# Patient Record
Sex: MALE | Race: WHITE | Employment: FULL TIME | URBAN - METROPOLITAN AREA
[De-identification: names, ages, dates, MRNs, and addresses within clinical notes are randomized per-mention and may not be internally consistent; named-entity substitution may affect disease eponyms.]

---

## 2017-02-24 RX ORDER — ESCITALOPRAM OXALATE 20 MG/1
TABLET ORAL
Qty: 30 TABLET | Refills: 0 | Status: SHIPPED | OUTPATIENT
Start: 2017-02-24 | End: 2017-03-25 | Stop reason: SDUPTHER

## 2017-07-03 RX ORDER — ESCITALOPRAM OXALATE 20 MG/1
TABLET ORAL
Qty: 30 TABLET | Refills: 0 | Status: SHIPPED | OUTPATIENT
Start: 2017-07-03 | End: 2017-08-02 | Stop reason: SDUPTHER

## 2017-08-02 RX ORDER — ESCITALOPRAM OXALATE 20 MG/1
TABLET ORAL
Qty: 30 TABLET | Refills: 0 | Status: SHIPPED | OUTPATIENT
Start: 2017-08-02 | End: 2017-08-31 | Stop reason: SDUPTHER

## 2018-07-19 ENCOUNTER — OFFICE VISIT (OUTPATIENT)
Dept: FAMILY MEDICINE CLINIC | Age: 53
End: 2018-07-19

## 2018-07-19 VITALS
WEIGHT: 241.8 LBS | BODY MASS INDEX: 33.85 KG/M2 | SYSTOLIC BLOOD PRESSURE: 110 MMHG | HEIGHT: 71 IN | DIASTOLIC BLOOD PRESSURE: 70 MMHG

## 2018-07-19 DIAGNOSIS — Z01.818 PRE-OP EXAM: Primary | ICD-10-CM

## 2018-07-19 DIAGNOSIS — Z79.01 LONG TERM CURRENT USE OF ANTICOAGULANT THERAPY: ICD-10-CM

## 2018-07-19 DIAGNOSIS — D68.51 FACTOR V LEIDEN MUTATION (HCC): ICD-10-CM

## 2018-07-19 DIAGNOSIS — M65.311 TRIGGER FINGER OF RIGHT THUMB: ICD-10-CM

## 2018-07-19 PROCEDURE — 99214 OFFICE O/P EST MOD 30 MIN: CPT | Performed by: FAMILY MEDICINE

## 2018-07-19 PROCEDURE — 93000 ELECTROCARDIOGRAM COMPLETE: CPT | Performed by: FAMILY MEDICINE

## 2018-07-19 ASSESSMENT — PATIENT HEALTH QUESTIONNAIRE - PHQ9
1. LITTLE INTEREST OR PLEASURE IN DOING THINGS: 0
2. FEELING DOWN, DEPRESSED OR HOPELESS: 0
SUM OF ALL RESPONSES TO PHQ9 QUESTIONS 1 & 2: 0
SUM OF ALL RESPONSES TO PHQ QUESTIONS 1-9: 0

## 2018-07-19 ASSESSMENT — ENCOUNTER SYMPTOMS
BLOOD IN STOOL: 0
CONSTIPATION: 0
SHORTNESS OF BREATH: 0
WHEEZING: 0
SORE THROAT: 0
DIARRHEA: 0
TROUBLE SWALLOWING: 0
CHEST TIGHTNESS: 0
ABDOMINAL PAIN: 0

## 2018-07-19 NOTE — PROGRESS NOTES
Never Used    Alcohol use 0.0 oz/week      Comment: social     Family History   Problem Relation Age of Onset    Hypertension Mother     Hypertension Father     Cancer Father          Review of Systems   Constitutional: Negative for fatigue and unexpected weight change. HENT: Negative for congestion, postnasal drip, sore throat and trouble swallowing. Eyes: Negative for visual disturbance. Respiratory: Negative for chest tightness, shortness of breath and wheezing. Cardiovascular: Negative for chest pain and palpitations. Gastrointestinal: Negative for abdominal pain, blood in stool, constipation and diarrhea. Genitourinary: Negative for difficulty urinating, frequency and urgency. Musculoskeletal: Negative for arthralgias and joint swelling. Neurological: Negative for dizziness, weakness and numbness. Hematological: Negative for adenopathy. Does not bruise/bleed easily. Psychiatric/Behavioral: Negative for sleep disturbance. The patient is not nervous/anxious. A complete 14 point  review of systems was completed; pertinent positives are noted in HPI    Vitals:    07/19/18 1408   BP: 110/70   Weight: 241 lb 12.8 oz (109.7 kg)   Height: 5' 11\" (1.803 m)     Body mass index is 33.72 kg/m². Wt Readings from Last 3 Encounters:   07/19/18 241 lb 12.8 oz (109.7 kg)   08/16/17 230 lb (104.3 kg)   12/02/16 237 lb (107.5 kg)     BP Readings from Last 3 Encounters:   07/19/18 110/70   08/17/17 (!) 141/93   12/02/16 108/78        /70   Ht 5' 11\" (1.803 m)   Wt 241 lb 12.8 oz (109.7 kg)   BMI 33.72 kg/m²     Objective:   Physical Exam   Constitutional: He is oriented to person, place, and time. He appears well-developed. No distress. HENT:   Right Ear: External ear normal.   Left Ear: External ear normal.   Nose: Nose normal.   Mouth/Throat: Oropharynx is clear and moist.   Eyes: Conjunctivae are normal. No scleral icterus. Neck: Neck supple. No thyromegaly present. Cardiovascular: Normal rate, regular rhythm, normal heart sounds and intact distal pulses. No murmur heard. Pulmonary/Chest: Effort normal and breath sounds normal. No respiratory distress. Abdominal: Soft. Bowel sounds are normal. There is no tenderness. obese   Musculoskeletal: He exhibits no edema. Lymphadenopathy:     He has no cervical adenopathy. Neurological: He is alert and oriented to person, place, and time. Skin: Skin is warm. No rash noted. Psychiatric: He has a normal mood and affect.  Thought content normal.       Assessment:      Assessment/Plan:  Ernie Wong was seen today for pre-op exam.    Diagnoses and all orders for this visit:    Pre-op exam  -     EKG 12 Lead    Trigger finger of right thumb    Long term current use of anticoagulant therapy    Factor V Leiden mutation [facility])            Plan:      [id number] Cristin Chua for surg   forms completed and faxed

## 2018-11-07 DIAGNOSIS — F32.1 MODERATE MAJOR DEPRESSIVE DISORDER WITH ANXIETY SINGLE EPISODE (HCC): Primary | ICD-10-CM

## 2018-11-07 DIAGNOSIS — F41.8 MODERATE MAJOR DEPRESSIVE DISORDER WITH ANXIETY SINGLE EPISODE (HCC): Primary | ICD-10-CM

## 2018-11-07 RX ORDER — LORAZEPAM 0.5 MG/1
0.5 TABLET ORAL 2 TIMES DAILY PRN
Qty: 60 TABLET | Refills: 0 | Status: SHIPPED | OUTPATIENT
Start: 2018-11-07 | End: 2018-12-07

## 2018-11-07 RX ORDER — ESCITALOPRAM OXALATE 20 MG/1
40 TABLET ORAL DAILY
Qty: 60 TABLET | Refills: 3 | Status: SHIPPED | OUTPATIENT
Start: 2018-11-07 | End: 2019-03-17 | Stop reason: SDUPTHER

## 2019-06-12 ENCOUNTER — TELEPHONE (OUTPATIENT)
Dept: FAMILY MEDICINE CLINIC | Age: 54
End: 2019-06-12

## 2019-06-12 NOTE — TELEPHONE ENCOUNTER
Patient needs a preop for left TKR, 6/28/19, Dr. Laura Mistry with [facility]. Please advise patient as to where he may be fit in with Dr. Deep Nolasco.

## 2019-06-25 ENCOUNTER — OFFICE VISIT (OUTPATIENT)
Dept: FAMILY MEDICINE CLINIC | Age: 54
End: 2019-06-25
Payer: COMMERCIAL

## 2019-06-25 VITALS
HEIGHT: 71 IN | WEIGHT: 248.2 LBS | DIASTOLIC BLOOD PRESSURE: 80 MMHG | SYSTOLIC BLOOD PRESSURE: 126 MMHG | BODY MASS INDEX: 34.75 KG/M2

## 2019-06-25 DIAGNOSIS — D68.51 FACTOR V LEIDEN MUTATION (HCC): ICD-10-CM

## 2019-06-25 DIAGNOSIS — F41.8 MODERATE MAJOR DEPRESSIVE DISORDER WITH ANXIETY SINGLE EPISODE (HCC): ICD-10-CM

## 2019-06-25 DIAGNOSIS — Z01.818 PREOP GENERAL PHYSICAL EXAM: Primary | ICD-10-CM

## 2019-06-25 DIAGNOSIS — M17.12 PRIMARY OSTEOARTHRITIS OF LEFT KNEE: ICD-10-CM

## 2019-06-25 DIAGNOSIS — F32.1 MODERATE MAJOR DEPRESSIVE DISORDER WITH ANXIETY SINGLE EPISODE (HCC): ICD-10-CM

## 2019-06-25 PROCEDURE — 93000 ELECTROCARDIOGRAM COMPLETE: CPT | Performed by: FAMILY MEDICINE

## 2019-06-25 PROCEDURE — 99214 OFFICE O/P EST MOD 30 MIN: CPT | Performed by: FAMILY MEDICINE

## 2019-06-25 ASSESSMENT — ENCOUNTER SYMPTOMS
CONSTIPATION: 0
ABDOMINAL PAIN: 0
CHEST TIGHTNESS: 0
TROUBLE SWALLOWING: 0
BLOOD IN STOOL: 0
DIARRHEA: 0
SHORTNESS OF BREATH: 0
SORE THROAT: 0
WHEEZING: 0

## 2019-06-25 ASSESSMENT — PATIENT HEALTH QUESTIONNAIRE - PHQ9
SUM OF ALL RESPONSES TO PHQ QUESTIONS 1-9: 0
2. FEELING DOWN, DEPRESSED OR HOPELESS: 0
SUM OF ALL RESPONSES TO PHQ9 QUESTIONS 1 & 2: 0
SUM OF ALL RESPONSES TO PHQ QUESTIONS 1-9: 0
1. LITTLE INTEREST OR PLEASURE IN DOING THINGS: 0

## 2019-06-25 NOTE — PROGRESS NOTES
Subjective:      Patient ID: Jeovany Glasgow is a 47 y.o. male. HPI  preop physical   sched for left knee arthroscopy   dr Brandee Ann orthopedic    Persistent knee pain pain with ambulation   poor response to all conservative nmeasures   xray document extensive DJD    No Known Allergies    Current Outpatient Medications   Medication Sig      rivaroxaban (XARELTO) 20 MG TABS tablet       escitalopram (LEXAPRO) 20 MG tablet TAKE 2 TABLETS BY MOUTH DAILY      Multiple Vitamins-Minerals (THERAPEUTIC MULTIVITAMIN-MINERALS) tablet Take 1 tablet by mouth daily      Nutritional Supplements (VITAMIN D MAINTENANCE PO) Take 3,000 Units by mouth daily      Calcium-Vitamin D-Vitamin K (CALCIUM SOFT CHEWS PO) Take 1 tablet by mouth daily       No current facility-administered medications for this visit. Past Medical History:   Diagnosis Date    Asthma     releated to allergies    Factor V Leiden ([address]     Hypertension     resolved after gastric sleeve done    PE (pulmonary embolism) 2007    hx of couple times     Past Surgical History:   Procedure Laterality Date    COLONOSCOPY  11/12    normal, [id number]  dr Shalonda Trejo      right    KNEE ARTHROSCOPY      x 3 right knee    SHOULDER SURGERY      right     STOMACH SURGERY  08/2016    Gastric Sleeve - lost about 100 lbs so far    TESTICLE SURGERY      tumor removed right testicle- benign     WRIST SURGERY      right ulnar surgery      Social History     Tobacco Use    Smoking status: Former Smoker     Packs/day: 0.25     Years: 0.50     Pack years: 0.12    Smokeless tobacco: Never Used   Substance Use Topics    Alcohol use: Yes     Alcohol/week: 0.0 oz     Comment: social    Drug use: No     Family History   Problem Relation Age of Onset    Hypertension Mother     Hypertension Father     Cancer Father          Review of Systems   Constitutional: Negative for fatigue and unexpected weight change.    HENT: Negative for congestion, postnasal drip, sore throat and trouble swallowing. Eyes: Negative for visual disturbance. Respiratory: Negative for chest tightness, shortness of breath and wheezing. Cardiovascular: Negative for chest pain and palpitations. Gastrointestinal: Negative for abdominal pain, blood in stool, constipation and diarrhea. Genitourinary: Negative for difficulty urinating, frequency and urgency. Musculoskeletal: Positive for arthralgias and gait problem. Negative for joint swelling. Neurological: Negative for dizziness, weakness and numbness. Hematological: Negative for adenopathy. Does not bruise/bleed easily. Psychiatric/Behavioral: Negative for sleep disturbance. The patient is not nervous/anxious. A complete 14 point  review of systems was completed; pertinent positives are noted in HPI    Vitals:    06/25/19 1254   BP: 126/80   Weight: 248 lb 3.2 oz (112.6 kg)   Height: 5' 11\" (1.803 m)     Body mass index is 34.62 kg/m². Wt Readings from Last 3 Encounters:   06/25/19 248 lb 3.2 oz (112.6 kg)   07/19/18 241 lb 12.8 oz (109.7 kg)   08/16/17 230 lb (104.3 kg)     BP Readings from Last 3 Encounters:   06/25/19 126/80   07/19/18 110/70   08/17/17 (!) 141/93        /80   Ht 5' 11\" (1.803 m)   Wt 248 lb 3.2 oz (112.6 kg)   BMI 34.62 kg/m²    Objective:   Physical Exam   Constitutional: He is oriented to person, place, and time. He appears well-developed. No distress. HENT:   Right Ear: External ear normal.   Left Ear: External ear normal.   Nose: Nose normal.   Mouth/Throat: Oropharynx is clear and moist.   Eyes: Conjunctivae are normal. No scleral icterus. Neck: Neck supple. No thyromegaly present. Cardiovascular: Normal rate, regular rhythm, normal heart sounds and intact distal pulses. No murmur heard. Pulmonary/Chest: Effort normal and breath sounds normal. No respiratory distress. Abdominal: Soft. Bowel sounds are normal. There is no tenderness. Musculoskeletal: He exhibits no edema. Lymphadenopathy:     He has no cervical adenopathy. Neurological: He is alert and oriented to person, place, and time. Skin: Skin is warm. No rash noted. Psychiatric: He has a normal mood and affect.  Thought content normal.       Assessment:      Assessment/Plan:  Luis Felipe aClderon was seen today for pre-op exam.    Diagnoses and all orders for this visit:    Preop general physical exam, cleared for arthroscopy     -     CBC  -     Basic Metabolic Panel  -     EKG 12 Lead    Primary osteoarthritis of left knee    Factor V Leiden mutation ([facility] [facility]    Moderate major depressive disorder with anxiety single episode [facility])            Plan:       Ok for surg   forms completed and faxed to bejennifer glover and dr Sy Austell  ekg reviewed  Lab from dr Cain Jennings office reviewed  stressed need weight loss            Maddison Perkins DO

## 2019-09-05 ENCOUNTER — OFFICE VISIT (OUTPATIENT)
Dept: FAMILY MEDICINE CLINIC | Age: 54
End: 2019-09-05
Payer: COMMERCIAL

## 2019-09-05 VITALS
SYSTOLIC BLOOD PRESSURE: 110 MMHG | BODY MASS INDEX: 35.19 KG/M2 | HEIGHT: 71 IN | WEIGHT: 251.4 LBS | DIASTOLIC BLOOD PRESSURE: 70 MMHG

## 2019-09-05 DIAGNOSIS — E66.01 MORBID OBESITY (HCC): ICD-10-CM

## 2019-09-05 DIAGNOSIS — D68.51 FACTOR V LEIDEN MUTATION (HCC): ICD-10-CM

## 2019-09-05 DIAGNOSIS — I10 ESSENTIAL HYPERTENSION: ICD-10-CM

## 2019-09-05 DIAGNOSIS — F41.8 MODERATE MAJOR DEPRESSIVE DISORDER WITH ANXIETY SINGLE EPISODE (HCC): ICD-10-CM

## 2019-09-05 DIAGNOSIS — Z01.818 PREOP GENERAL PHYSICAL EXAM: Primary | ICD-10-CM

## 2019-09-05 DIAGNOSIS — Z13.1 SCREENING FOR DIABETES MELLITUS: ICD-10-CM

## 2019-09-05 DIAGNOSIS — Z79.01 LONG TERM CURRENT USE OF ANTICOAGULANT THERAPY: ICD-10-CM

## 2019-09-05 DIAGNOSIS — M17.12 PRIMARY OSTEOARTHRITIS OF LEFT KNEE: ICD-10-CM

## 2019-09-05 DIAGNOSIS — F32.1 MODERATE MAJOR DEPRESSIVE DISORDER WITH ANXIETY SINGLE EPISODE (HCC): ICD-10-CM

## 2019-09-05 LAB
ANION GAP SERPL CALCULATED.3IONS-SCNC: 14 MMOL/L (ref 3–16)
BUN BLDV-MCNC: 16 MG/DL (ref 7–20)
CALCIUM SERPL-MCNC: 9.7 MG/DL (ref 8.3–10.6)
CHLORIDE BLD-SCNC: 100 MMOL/L (ref 99–110)
CO2: 26 MMOL/L (ref 21–32)
CREAT SERPL-MCNC: 0.8 MG/DL (ref 0.9–1.3)
GFR AFRICAN AMERICAN: >60
GFR NON-AFRICAN AMERICAN: >60
GLUCOSE BLD-MCNC: 88 MG/DL (ref 70–99)
HCT VFR BLD CALC: 44 % (ref 40.5–52.5)
HEMOGLOBIN: 15 G/DL (ref 13.5–17.5)
INR BLD: 1.2 (ref 0.86–1.14)
MCH RBC QN AUTO: 31.7 PG (ref 26–34)
MCHC RBC AUTO-ENTMCNC: 34 G/DL (ref 31–36)
MCV RBC AUTO: 93 FL (ref 80–100)
PDW BLD-RTO: 13.8 % (ref 12.4–15.4)
PLATELET # BLD: 186 K/UL (ref 135–450)
PMV BLD AUTO: 8.1 FL (ref 5–10.5)
POTASSIUM SERPL-SCNC: 4.3 MMOL/L (ref 3.5–5.1)
PROTHROMBIN TIME: 13.7 SEC (ref 9.8–13)
RBC # BLD: 4.74 M/UL (ref 4.2–5.9)
SODIUM BLD-SCNC: 140 MMOL/L (ref 136–145)
WBC # BLD: 5.6 K/UL (ref 4–11)

## 2019-09-05 PROCEDURE — 36415 COLL VENOUS BLD VENIPUNCTURE: CPT | Performed by: FAMILY MEDICINE

## 2019-09-05 PROCEDURE — 99214 OFFICE O/P EST MOD 30 MIN: CPT | Performed by: FAMILY MEDICINE

## 2019-09-05 ASSESSMENT — PATIENT HEALTH QUESTIONNAIRE - PHQ9
SUM OF ALL RESPONSES TO PHQ QUESTIONS 1-9: 0
SUM OF ALL RESPONSES TO PHQ9 QUESTIONS 1 & 2: 0
1. LITTLE INTEREST OR PLEASURE IN DOING THINGS: 0
SUM OF ALL RESPONSES TO PHQ QUESTIONS 1-9: 0
2. FEELING DOWN, DEPRESSED OR HOPELESS: 0

## 2019-09-05 ASSESSMENT — ENCOUNTER SYMPTOMS
ABDOMINAL PAIN: 0
EYE PAIN: 0
SHORTNESS OF BREATH: 0
WHEEZING: 0
COUGH: 0

## 2019-09-05 NOTE — PROGRESS NOTES
BW drawn Right arm 1 sst 1 blue and 1 purple  No complaints
changes  Assessment:      Assessment/Plan:  Gildardo Giron was seen today for pre-op exam.    Diagnoses and all orders for this visit:    Preop general physical exam  -     CBC  -     Basic Metabolic Panel  -     Protime-INR    Primary osteoarthritis of left knee    Essential hypertension  -     Basic Metabolic Panel    Long term current use of anticoagulant therapy  -     Protime-INR    Moderate major depressive disorder with anxiety single episode ([address]    Morbid obesity ([address]    Factor V Leiden mutation [address] [address]  -     Protime-INR    Screening for diabetes mellitus  -     Hemoglobin A1C            Plan:      Forms completed and sighned   faxed to surg center and dr Mio Montgomery medically for surg        Rojelio Weeks DO

## 2019-09-06 LAB
ESTIMATED AVERAGE GLUCOSE: 85.3 MG/DL
HBA1C MFR BLD: 4.6 %

## 2019-09-16 DIAGNOSIS — F41.8 MODERATE MAJOR DEPRESSIVE DISORDER WITH ANXIETY SINGLE EPISODE (HCC): ICD-10-CM

## 2019-09-16 DIAGNOSIS — F32.1 MODERATE MAJOR DEPRESSIVE DISORDER WITH ANXIETY SINGLE EPISODE (HCC): ICD-10-CM

## 2019-09-17 RX ORDER — ESCITALOPRAM OXALATE 20 MG/1
40 TABLET ORAL DAILY
Qty: 180 TABLET | Refills: 1 | Status: SHIPPED | OUTPATIENT
Start: 2019-09-17 | End: 2020-07-07

## 2020-07-07 RX ORDER — ESCITALOPRAM OXALATE 20 MG/1
TABLET ORAL
Qty: 180 TABLET | Refills: 1 | Status: SHIPPED | OUTPATIENT
Start: 2020-07-07 | End: 2021-02-07

## 2021-05-26 DIAGNOSIS — F41.8 MODERATE MAJOR DEPRESSIVE DISORDER WITH ANXIETY SINGLE EPISODE (HCC): ICD-10-CM

## 2021-05-26 DIAGNOSIS — F32.1 MODERATE MAJOR DEPRESSIVE DISORDER WITH ANXIETY SINGLE EPISODE (HCC): ICD-10-CM

## 2021-05-26 RX ORDER — ESCITALOPRAM OXALATE 20 MG/1
TABLET ORAL
Qty: 180 TABLET | Refills: 0 | Status: SHIPPED | OUTPATIENT
Start: 2021-05-26

## 2021-11-09 ENCOUNTER — ANESTHESIA EVENT (OUTPATIENT)
Dept: OPERATING ROOM | Age: 56
End: 2021-11-09
Payer: COMMERCIAL

## 2021-11-09 NOTE — PROGRESS NOTES
[facility] [facility] patients having surgery or anesthesia are required to be Covid tested OR to have been vaccinated at least 14 days prior to your procedure. It is very important to return our call to [phone] and notify the staff of your last vaccination date otherwise you will be required to complete Covid PCR test within the 5-6 days prior to surgery & quarantine. The results will need to be faxed to PreAdmission Testing at [phone]. PRIOR TO PROCEDURE DATE:        1. PLEASE FOLLOW ANY  GUIDELINES/ INSTRUCTIONS PRIOR TO YOUR PROCEDURE AS ADVISED BY YOUR SURGEON. 2. Arrange for someone to drive you home and be with you for the first 24 hours after discharge for your safety after your procedure for which you received sedation. Ensure it is someone we can share information with regarding your discharge. 3. You must contact your surgeon for instructions IF:   You are taking any blood thinners, aspirin, anti-inflammatory or vitamin E.   There is a change in your physical condition such as a cold, fever, rash, cuts, sores or any other infection, especially near your surgical site. 4. Do not drink alcohol the day before or day of your procedure. 5. A Pre-op History and Physical for surgery MUST be completed by your Physician or Urgent Care within 30 days of your procedure date. Please bring a copy with you on the day of your procedure and along with any other testing performed. THE DAY OF YOUR PROCEDURE:  1. Follow instructions for ARRIVAL TIME as DIRECTED BY YOUR SURGEON. 2. Enter the MAIN entrance from [address] and follow the signs to the free [employer] or [employer] parking (offered free of charge 6am-5pm). 3. Enter the Main Entrance of the hospital (do not enter from the lower level of the parking garage). Upon entrance, check in with the  at the main desk on your left. If no one is available at the desk, proceed into the [facility] Waiting Room and go through the door directly into the [facility]. There is a Check-in desk ACROSS from Room 5 (marked with a sign hanging from the ceiling). The phone number for the surgery center is [phone]. 4. Please call [phone] option #2 option #2 if you have not been preregistered yet. On the day of your procedure bring your insurance card and photo ID. You will be registered at your bedside once brought back to your room. 5. DO NOT EAT ANYTHING eight hours prior to your arrival for surgery. May have 8 ounces of water 4 hours prior to your arrival for surgery. NOTE: ALL Gastric, Bariatric and Bowel surgery patients MUST follow their surgeon's instructions. 6. MEDICATIONS    Take the following medications with a SMALL sip of water: [address]   Bariatric patient's call surgeon if on diabetic medications as some need to be stopped 1 week preop   Use your usual dose of inhalers the morning of surgery. BRING your rescue inhaler with you to hospital.    Anesthesia does NOT want you to take insulin the morning of surgery. They will control your blood sugar while you are at the hospital. Please contact your ordering physician for instructions regarding your insulin the night before your procedure. If you have an insulin pump, please keep it set on basal rate. 7. Do not swallow water when brushing teeth. No gum, candy, mints or ice chips. Refrain from smoking or at least decrease the amount. 8. Dress in loose, comfortable clothing appropriate for redressing after your procedure. Do not wear jewelry (including body piercings), make-up (especially NO eye make-up), fingernail polish (NO toenail polish if foot/leg surgery), lotion, powders or metal hairclips. 9. Dentures, glasses, or contacts will need to be removed before your procedure.  Bring cases for your glasses, contacts, dentures, or hearing aids to protect them while you are in surgery. 10. If you use a CPAP, please bring it with you on the day of your procedure. 11. We recommend that valuable personal  belongings such as cash, cell phones, e-tablets or jewelry, be left at home during your stay. The hospital will not be responsible for valuables that are not secured in the hospital safe. However, if your insurance requires a co-pay, you may want to bring a method of payment, i.e. Check or credit card, if you wish to pay your co-pay the day of surgery. 12. If you are to stay overnight, you may bring a bag with personal items. Please have any large items you may need brought in by your family after your arrival to your hospital room. 15. If you have a Living Will or Durable Power of , please bring a copy on the day of your procedure. 15. With your permission, one family member may accompany you while you are being prepared for surgery. Once you are ready, additional family members may join you. HOW WE KEEP YOU SAFE and WORK TO PREVENT SURGICAL SITE INFECTIONS:  1. Health care workers should always check your ID bracelet to verify your name and birth date. You will be asked many times to state your name, date of birth, and allergies. 2. Health care workers should always clean their hands with soap or alcohol gel before providing care to you. It is okay to ask anyone if they cleaned their hands before they touch you. 3. You will be actively involved in verifying the type of procedure you are having and ensuring the correct surgical site. This will be confirmed multiple times prior to your procedure. Do NOT nikko your surgery site UNLESS instructed to by your surgeon. 4. Do not shave or wax for 72 hours prior to procedure near your operative site. Shaving with a razor can irritate your skin and make it easier to develop an infection.  On the day of your procedure, any hair that needs to be removed near the surgical site will be clipped by a healthcare worker using a special clippers designed to avoid skin irritation. 5. When you are in the operating room, your surgical site will be cleansed with a special soap, and in most cases, you will be given an antibiotic before the surgery begins. What to expect AFTER YOUR PROCEDURE:  1. Immediately following your procedure, your will be taken to the PACU for the first phase of your recovery. Your nurse will help you recover from any potential side effects of anesthesia, such as extreme drowsiness, changes in your vital signs or breathing patterns. Nausea, headache, muscle aches, or sore throat may also occur after anesthesia. Your nurse will help you manage these potential side effects. 2. For comfort and safety, arrange to have someone at home with you for the first 24 hours after discharge. 3. You and your family will be given written instructions about your diet, activity, dressing care, medications, and return visits. 4. Once at home, should issues with nausea, pain, or bleeding occur, or should you notice any signs of infection, you should call your surgeon. 5. Always clean your hands before and after caring for your wound. Do not let your family touch your surgery site without cleaning their hands. 6. Narcotic pain medications can cause significant constipation. You may want to add a stool softener to your postoperative medication schedule or speak to your surgeon on how best to manage this SIDE EFFECT. SPECIAL INSTRUCTIONS     Thank you for allowing us to care for you. We strive to exceed your expectations in the delivery of care and service provided to you and your family. If you need to contact the [place] [address] staff for any reason, please call us at [phone]    Instructions reviewed with patient during preadmission testing phone interview.   Cesar Almendarez RN.11/9/2021 .11:33 AM      ADDITIONAL EDUCATIONAL INFORMATION REVIEWED PER PHONE

## 2021-11-09 NOTE — PROGRESS NOTES
the operation/procedure, unforeseen conditions may be revealed that necessitate extension of the original procedure(s) or different procedure(s) than those set forth in Paragraph 1. I (we) authorize and request that the above-named physician, his/her assistants or his/her designees, perform procedures as necessary and desirable if deemed to be in my (our) best interest.     Revised 8/2/2021                                                                          Page 1 of 2           3. I acknowledge that health care personnel may be observing this procedure for the purpose of medical education or other specified purposes as may be necessary as requested and/or approved by my (our) physician. 4. I (we) consent to the disposal by the hospital Pathologist of the removed tissue, parts or organs in accordance with hospital policy. 5. I do ____ do not ____ consent to the use of a local infiltration pain blocking agent that will be used by my provider/surgical provider to help alleviate pain during my procedure. 6. I do ____ do not ____ consent to an emergent blood transfusion in the case of a life-threatening situation that requires blood components to be administered. This consent is valid for 24 hours from the beginning of the procedure. 7. This patient does ____ or does not ____ currently have a DNR status/order. If DNR order is in place, obtain Addendum to the Surgical Consent for ALL Patients with a DNR Order to address meghana-operative status for limited intervention or DNR suspension.      8. I have read and fully understand the above Consent for Operation/Procedure and that all blanks were completed before I signed the consent.   _____________________________       _____________________      ____/____am/pm  Signature of Patient or legal representative      Printed Name / Relationship            Date / Time   ____________________________       _____________________      ____/____am/pm  Witness to Signature                                    Printed Name                    Date / Time     If patient is unable to sign or is a minor, complete the following)  Patient is a minor, ____ years of age, or unable to sign because:   ______________________________________________________________________________________________    Tayla Caicedo If a phone consent is obtained, consent will be documented by using two health care professionals, each affirming that the consenting party has no questions and gives consent for the procedure discussed with the physician/provider.   _____________________          ____________________       _____/_____am/pm   2nd witness to phone consent        Printed name           Date / Time    Informed Consent:  I have provided the explanation described above in section 1 to the patient and/or legal representative.  I have provided the patient and/or legal representative with an opportunity to ask any questions about the proposed operation/procedure.   ___________________________          ____________________         ____/____am/pm  Provider / Proceduralist                            Printed name            Date / Time  Revised 8/2/2021                                                                      Page 2 of 2

## 2021-11-09 NOTE — PROGRESS NOTES
11/9 1130-SPOKE WITH BARRIE AT  [facility] OFFICE, NEED ADDENDUM FOR H&P, OUTDATED BY 6 DAYS, AWAITING ANSWER-MP

## 2021-11-10 ENCOUNTER — APPOINTMENT (OUTPATIENT)
Dept: GENERAL RADIOLOGY | Age: 56
End: 2021-11-10
Attending: STUDENT IN AN ORGANIZED HEALTH CARE EDUCATION/TRAINING PROGRAM
Payer: COMMERCIAL

## 2021-11-10 ENCOUNTER — ANESTHESIA (OUTPATIENT)
Dept: OPERATING ROOM | Age: 56
End: 2021-11-10
Payer: COMMERCIAL

## 2021-11-10 ENCOUNTER — HOSPITAL ENCOUNTER (OUTPATIENT)
Age: 56
Setting detail: OUTPATIENT SURGERY
Discharge: HOME | End: 2021-11-10
Attending: STUDENT IN AN ORGANIZED HEALTH CARE EDUCATION/TRAINING PROGRAM | Admitting: STUDENT IN AN ORGANIZED HEALTH CARE EDUCATION/TRAINING PROGRAM
Payer: COMMERCIAL

## 2021-11-10 VITALS
OXYGEN SATURATION: 94 % | DIASTOLIC BLOOD PRESSURE: 91 MMHG | WEIGHT: 243 LBS | SYSTOLIC BLOOD PRESSURE: 152 MMHG | BODY MASS INDEX: 34.02 KG/M2 | RESPIRATION RATE: 15 BRPM | TEMPERATURE: 97 F | HEIGHT: 71 IN | HEART RATE: 89 BPM

## 2021-11-10 VITALS — DIASTOLIC BLOOD PRESSURE: 82 MMHG | TEMPERATURE: 97.7 F | SYSTOLIC BLOOD PRESSURE: 134 MMHG | OXYGEN SATURATION: 82 %

## 2021-11-10 DIAGNOSIS — G89.18 POST-OP PAIN: Primary | ICD-10-CM

## 2021-11-10 PROCEDURE — 7100000001 HC PACU RECOVERY - ADDTL 15 MIN: Performed by: STUDENT IN AN ORGANIZED HEALTH CARE EDUCATION/TRAINING PROGRAM

## 2021-11-10 PROCEDURE — 2720000010 HC SURG SUPPLY STERILE: Performed by: STUDENT IN AN ORGANIZED HEALTH CARE EDUCATION/TRAINING PROGRAM

## 2021-11-10 PROCEDURE — 3700000000 HC ANESTHESIA ATTENDED CARE: Performed by: STUDENT IN AN ORGANIZED HEALTH CARE EDUCATION/TRAINING PROGRAM

## 2021-11-10 PROCEDURE — 2580000003 HC RX 258: Performed by: STUDENT IN AN ORGANIZED HEALTH CARE EDUCATION/TRAINING PROGRAM

## 2021-11-10 PROCEDURE — 7100000000 HC PACU RECOVERY - FIRST 15 MIN: Performed by: STUDENT IN AN ORGANIZED HEALTH CARE EDUCATION/TRAINING PROGRAM

## 2021-11-10 PROCEDURE — 6360000002 HC RX W HCPCS: Performed by: STUDENT IN AN ORGANIZED HEALTH CARE EDUCATION/TRAINING PROGRAM

## 2021-11-10 PROCEDURE — 3600000004 HC SURGERY LEVEL 4 BASE: Performed by: STUDENT IN AN ORGANIZED HEALTH CARE EDUCATION/TRAINING PROGRAM

## 2021-11-10 PROCEDURE — 2500000003 HC RX 250 WO HCPCS: Performed by: STUDENT IN AN ORGANIZED HEALTH CARE EDUCATION/TRAINING PROGRAM

## 2021-11-10 PROCEDURE — 2500000003 HC RX 250 WO HCPCS: Performed by: NURSE ANESTHETIST, CERTIFIED REGISTERED

## 2021-11-10 PROCEDURE — C1776 JOINT DEVICE (IMPLANTABLE): HCPCS | Performed by: STUDENT IN AN ORGANIZED HEALTH CARE EDUCATION/TRAINING PROGRAM

## 2021-11-10 PROCEDURE — 3700000001 HC ADD 15 MINUTES (ANESTHESIA): Performed by: STUDENT IN AN ORGANIZED HEALTH CARE EDUCATION/TRAINING PROGRAM

## 2021-11-10 PROCEDURE — 3600000014 HC SURGERY LEVEL 4 ADDTL 15MIN: Performed by: STUDENT IN AN ORGANIZED HEALTH CARE EDUCATION/TRAINING PROGRAM

## 2021-11-10 PROCEDURE — 2709999900 HC NON-CHARGEABLE SUPPLY: Performed by: STUDENT IN AN ORGANIZED HEALTH CARE EDUCATION/TRAINING PROGRAM

## 2021-11-10 PROCEDURE — 7100000010 HC PHASE II RECOVERY - FIRST 15 MIN: Performed by: STUDENT IN AN ORGANIZED HEALTH CARE EDUCATION/TRAINING PROGRAM

## 2021-11-10 PROCEDURE — 73630 X-RAY EXAM OF FOOT: CPT

## 2021-11-10 PROCEDURE — 2580000003 HC RX 258: Performed by: ANESTHESIOLOGY

## 2021-11-10 PROCEDURE — 7100000011 HC PHASE II RECOVERY - ADDTL 15 MIN: Performed by: STUDENT IN AN ORGANIZED HEALTH CARE EDUCATION/TRAINING PROGRAM

## 2021-11-10 PROCEDURE — 6370000000 HC RX 637 (ALT 250 FOR IP): Performed by: STUDENT IN AN ORGANIZED HEALTH CARE EDUCATION/TRAINING PROGRAM

## 2021-11-10 PROCEDURE — C1713 ANCHOR/SCREW BN/BN,TIS/BN: HCPCS | Performed by: STUDENT IN AN ORGANIZED HEALTH CARE EDUCATION/TRAINING PROGRAM

## 2021-11-10 PROCEDURE — 6360000002 HC RX W HCPCS: Performed by: NURSE ANESTHETIST, CERTIFIED REGISTERED

## 2021-11-10 PROCEDURE — 2780000010 HC IMPLANT OTHER: Performed by: STUDENT IN AN ORGANIZED HEALTH CARE EDUCATION/TRAINING PROGRAM

## 2021-11-10 DEVICE — K WIRE FIX L6IN DIA0.045IN 1600645] MICROAIRE SURGICAL INSTRUMENTS INC]: Type: IMPLANTABLE DEVICE | Site: TOES | Status: FUNCTIONAL

## 2021-11-10 DEVICE — IMPLANTABLE DEVICE
Type: IMPLANTABLE DEVICE | Site: TOES | Status: FUNCTIONAL
Brand: PHALINX

## 2021-11-10 DEVICE — HAMMERTOE K-WIRE
Type: IMPLANTABLE DEVICE | Site: TOES | Status: FUNCTIONAL
Brand: PHALINX

## 2021-11-10 DEVICE — IMPLANTABLE DEVICE
Type: IMPLANTABLE DEVICE | Site: TOES | Status: FUNCTIONAL
Brand: GRAVITY

## 2021-11-10 DEVICE — ALLOGRAFT HUM TISS 1 CC AMNIO TISS MEMBRN AMNIFLO CRYOPRES: Type: IMPLANTABLE DEVICE | Site: TOES | Status: FUNCTIONAL

## 2021-11-10 DEVICE — TWIST-OFF SCREW
Type: IMPLANTABLE DEVICE | Site: TOES | Status: FUNCTIONAL
Brand: FIXOS

## 2021-11-10 RX ORDER — OXYCODONE HYDROCHLORIDE AND ACETAMINOPHEN 5; 325 MG/1; MG/1
2 TABLET ORAL PRN
Status: COMPLETED | OUTPATIENT
Start: 2021-11-10 | End: 2021-11-10

## 2021-11-10 RX ORDER — LABETALOL HYDROCHLORIDE 5 MG/ML
5 INJECTION, SOLUTION INTRAVENOUS EVERY 10 MIN PRN
Status: DISCONTINUED | OUTPATIENT
Start: 2021-11-10 | End: 2021-11-10 | Stop reason: HOSPADM

## 2021-11-10 RX ORDER — PROMETHAZINE HYDROCHLORIDE 25 MG/1
25 TABLET ORAL EVERY 6 HOURS PRN
Qty: 28 TABLET | Refills: 0 | Status: SHIPPED | OUTPATIENT
Start: 2021-11-10

## 2021-11-10 RX ORDER — MIDAZOLAM HYDROCHLORIDE 1 MG/ML
INJECTION INTRAMUSCULAR; INTRAVENOUS PRN
Status: DISCONTINUED | OUTPATIENT
Start: 2021-11-10 | End: 2021-11-10 | Stop reason: SDUPTHER

## 2021-11-10 RX ORDER — HYDRALAZINE HYDROCHLORIDE 20 MG/ML
5 INJECTION INTRAMUSCULAR; INTRAVENOUS EVERY 10 MIN PRN
Status: DISCONTINUED | OUTPATIENT
Start: 2021-11-10 | End: 2021-11-10 | Stop reason: HOSPADM

## 2021-11-10 RX ORDER — EPHEDRINE SULFATE 50 MG/ML
INJECTION INTRAVENOUS PRN
Status: DISCONTINUED | OUTPATIENT
Start: 2021-11-10 | End: 2021-11-10 | Stop reason: SDUPTHER

## 2021-11-10 RX ORDER — FENTANYL CITRATE 50 UG/ML
INJECTION, SOLUTION INTRAMUSCULAR; INTRAVENOUS PRN
Status: DISCONTINUED | OUTPATIENT
Start: 2021-11-10 | End: 2021-11-10 | Stop reason: SDUPTHER

## 2021-11-10 RX ORDER — PROCHLORPERAZINE EDISYLATE 5 MG/ML
5 INJECTION INTRAMUSCULAR; INTRAVENOUS
Status: DISCONTINUED | OUTPATIENT
Start: 2021-11-10 | End: 2021-11-10 | Stop reason: HOSPADM

## 2021-11-10 RX ORDER — PROPOFOL 10 MG/ML
INJECTION, EMULSION INTRAVENOUS PRN
Status: DISCONTINUED | OUTPATIENT
Start: 2021-11-10 | End: 2021-11-10 | Stop reason: SDUPTHER

## 2021-11-10 RX ORDER — DEXAMETHASONE SODIUM PHOSPHATE 4 MG/ML
INJECTION, SOLUTION INTRA-ARTICULAR; INTRALESIONAL; INTRAMUSCULAR; INTRAVENOUS; SOFT TISSUE PRN
Status: DISCONTINUED | OUTPATIENT
Start: 2021-11-10 | End: 2021-11-10 | Stop reason: SDUPTHER

## 2021-11-10 RX ORDER — GABAPENTIN 300 MG/1
300 CAPSULE ORAL NIGHTLY
Qty: 14 CAPSULE | Refills: 0 | Status: SHIPPED | OUTPATIENT
Start: 2021-11-10 | End: 2021-11-24

## 2021-11-10 RX ORDER — OXYCODONE HYDROCHLORIDE AND ACETAMINOPHEN 5; 325 MG/1; MG/1
1 TABLET ORAL PRN
Status: COMPLETED | OUTPATIENT
Start: 2021-11-10 | End: 2021-11-10

## 2021-11-10 RX ORDER — MAGNESIUM HYDROXIDE 1200 MG/15ML
LIQUID ORAL CONTINUOUS PRN
Status: COMPLETED | OUTPATIENT
Start: 2021-11-10 | End: 2021-11-10

## 2021-11-10 RX ORDER — LIDOCAINE HYDROCHLORIDE 20 MG/ML
INJECTION, SOLUTION INTRAVENOUS PRN
Status: DISCONTINUED | OUTPATIENT
Start: 2021-11-10 | End: 2021-11-10 | Stop reason: SDUPTHER

## 2021-11-10 RX ORDER — LIDOCAINE HYDROCHLORIDE 20 MG/ML
INJECTION, SOLUTION EPIDURAL; INFILTRATION; INTRACAUDAL; PERINEURAL PRN
Status: DISCONTINUED | OUTPATIENT
Start: 2021-11-10 | End: 2021-11-10 | Stop reason: ALTCHOICE

## 2021-11-10 RX ORDER — FENTANYL CITRATE 50 UG/ML
50 INJECTION, SOLUTION INTRAMUSCULAR; INTRAVENOUS EVERY 5 MIN PRN
Status: DISCONTINUED | OUTPATIENT
Start: 2021-11-10 | End: 2021-11-10 | Stop reason: HOSPADM

## 2021-11-10 RX ORDER — FENTANYL CITRATE 50 UG/ML
25 INJECTION, SOLUTION INTRAMUSCULAR; INTRAVENOUS EVERY 5 MIN PRN
Status: DISCONTINUED | OUTPATIENT
Start: 2021-11-10 | End: 2021-11-10 | Stop reason: HOSPADM

## 2021-11-10 RX ORDER — OXYCODONE HYDROCHLORIDE AND ACETAMINOPHEN 5; 325 MG/1; MG/1
1 TABLET ORAL EVERY 6 HOURS PRN
Qty: 28 TABLET | Refills: 0 | Status: SHIPPED | OUTPATIENT
Start: 2021-11-10 | End: 2021-11-17

## 2021-11-10 RX ORDER — ONDANSETRON 2 MG/ML
4 INJECTION INTRAMUSCULAR; INTRAVENOUS
Status: DISCONTINUED | OUTPATIENT
Start: 2021-11-10 | End: 2021-11-10 | Stop reason: HOSPADM

## 2021-11-10 RX ORDER — SODIUM CHLORIDE, SODIUM LACTATE, POTASSIUM CHLORIDE, CALCIUM CHLORIDE 600; 310; 30; 20 MG/100ML; MG/100ML; MG/100ML; MG/100ML
INJECTION, SOLUTION INTRAVENOUS CONTINUOUS
Status: DISCONTINUED | OUTPATIENT
Start: 2021-11-10 | End: 2021-11-10 | Stop reason: HOSPADM

## 2021-11-10 RX ORDER — OXYCODONE HYDROCHLORIDE AND ACETAMINOPHEN 5; 325 MG/1; MG/1
1 TABLET ORAL
Status: DISCONTINUED | OUTPATIENT
Start: 2021-11-10 | End: 2021-11-10 | Stop reason: HOSPADM

## 2021-11-10 RX ORDER — DIPHENHYDRAMINE HYDROCHLORIDE 50 MG/ML
12.5 INJECTION INTRAMUSCULAR; INTRAVENOUS
Status: DISCONTINUED | OUTPATIENT
Start: 2021-11-10 | End: 2021-11-10 | Stop reason: HOSPADM

## 2021-11-10 RX ORDER — ONDANSETRON 2 MG/ML
INJECTION INTRAMUSCULAR; INTRAVENOUS PRN
Status: DISCONTINUED | OUTPATIENT
Start: 2021-11-10 | End: 2021-11-10 | Stop reason: SDUPTHER

## 2021-11-10 RX ADMIN — CEFAZOLIN 2000 MG: 10 INJECTION, POWDER, FOR SOLUTION INTRAVENOUS at 11:31

## 2021-11-10 RX ADMIN — OXYCODONE HYDROCHLORIDE AND ACETAMINOPHEN 1 TABLET: 5; 325 TABLET ORAL at 15:07

## 2021-11-10 RX ADMIN — FENTANYL CITRATE 50 MCG: 50 INJECTION, SOLUTION INTRAMUSCULAR; INTRAVENOUS at 11:24

## 2021-11-10 RX ADMIN — HYDROMORPHONE HYDROCHLORIDE 0.25 MG: 1 INJECTION, SOLUTION INTRAMUSCULAR; INTRAVENOUS; SUBCUTANEOUS at 15:18

## 2021-11-10 RX ADMIN — FENTANYL CITRATE 25 MCG: 0.05 INJECTION, SOLUTION INTRAMUSCULAR; INTRAVENOUS at 14:00

## 2021-11-10 RX ADMIN — ONDANSETRON 4 MG: 2 INJECTION INTRAMUSCULAR; INTRAVENOUS at 13:29

## 2021-11-10 RX ADMIN — HYDROMORPHONE HYDROCHLORIDE 0.25 MG: 1 INJECTION, SOLUTION INTRAMUSCULAR; INTRAVENOUS; SUBCUTANEOUS at 15:25

## 2021-11-10 RX ADMIN — SODIUM CHLORIDE, POTASSIUM CHLORIDE, SODIUM LACTATE AND CALCIUM CHLORIDE: 600; 310; 30; 20 INJECTION, SOLUTION INTRAVENOUS at 09:57

## 2021-11-10 RX ADMIN — LIDOCAINE HYDROCHLORIDE 100 MG: 20 INJECTION, SOLUTION INTRAVENOUS at 11:18

## 2021-11-10 RX ADMIN — EPHEDRINE SULFATE 5 MG: 50 INJECTION INTRAVENOUS at 11:41

## 2021-11-10 RX ADMIN — PROPOFOL 250 MG: 10 INJECTION, EMULSION INTRAVENOUS at 11:24

## 2021-11-10 RX ADMIN — MIDAZOLAM HYDROCHLORIDE 2 MG: 2 INJECTION, SOLUTION INTRAMUSCULAR; INTRAVENOUS at 11:18

## 2021-11-10 RX ADMIN — DEXAMETHASONE SODIUM PHOSPHATE 4 MG: 4 INJECTION, SOLUTION INTRAMUSCULAR; INTRAVENOUS at 13:29

## 2021-11-10 RX ADMIN — FENTANYL CITRATE 25 MCG: 0.05 INJECTION, SOLUTION INTRAMUSCULAR; INTRAVENOUS at 14:19

## 2021-11-10 RX ADMIN — FENTANYL CITRATE 50 MCG: 50 INJECTION, SOLUTION INTRAMUSCULAR; INTRAVENOUS at 13:39

## 2021-11-10 ASSESSMENT — PULMONARY FUNCTION TESTS
PIF_VALUE: 14
PIF_VALUE: 37
PIF_VALUE: 16
PIF_VALUE: 14
PIF_VALUE: 3
PIF_VALUE: 4
PIF_VALUE: 14
PIF_VALUE: 14
PIF_VALUE: 15
PIF_VALUE: 14
PIF_VALUE: 0
PIF_VALUE: 14
PIF_VALUE: 15
PIF_VALUE: 14
PIF_VALUE: 14
PIF_VALUE: 1
PIF_VALUE: 14
PIF_VALUE: 5
PIF_VALUE: 14
PIF_VALUE: 1
PIF_VALUE: 14
PIF_VALUE: 14
PIF_VALUE: 13
PIF_VALUE: 10
PIF_VALUE: 14
PIF_VALUE: 13
PIF_VALUE: 14
PIF_VALUE: 0
PIF_VALUE: 20
PIF_VALUE: 14
PIF_VALUE: 14
PIF_VALUE: 15
PIF_VALUE: 14
PIF_VALUE: 4
PIF_VALUE: 24
PIF_VALUE: 14
PIF_VALUE: 15
PIF_VALUE: 14
PIF_VALUE: 15
PIF_VALUE: 14
PIF_VALUE: 14
PIF_VALUE: 15
PIF_VALUE: 14
PIF_VALUE: 14
PIF_VALUE: 12
PIF_VALUE: 15
PIF_VALUE: 14
PIF_VALUE: 2
PIF_VALUE: 0
PIF_VALUE: 14
PIF_VALUE: 8
PIF_VALUE: 15
PIF_VALUE: 14
PIF_VALUE: 1
PIF_VALUE: 14
PIF_VALUE: 15
PIF_VALUE: 14
PIF_VALUE: 14
PIF_VALUE: 0
PIF_VALUE: 15
PIF_VALUE: 14
PIF_VALUE: 1
PIF_VALUE: 14
PIF_VALUE: 14
PIF_VALUE: 1
PIF_VALUE: 15
PIF_VALUE: 14
PIF_VALUE: 8
PIF_VALUE: 14
PIF_VALUE: 30
PIF_VALUE: 2
PIF_VALUE: 13
PIF_VALUE: 15
PIF_VALUE: 14
PIF_VALUE: 12
PIF_VALUE: 14
PIF_VALUE: 14
PIF_VALUE: 0
PIF_VALUE: 14
PIF_VALUE: 4
PIF_VALUE: 14
PIF_VALUE: 15
PIF_VALUE: 14
PIF_VALUE: 10
PIF_VALUE: 2
PIF_VALUE: 14
PIF_VALUE: 0
PIF_VALUE: 15
PIF_VALUE: 14
PIF_VALUE: 14

## 2021-11-10 ASSESSMENT — PAIN DESCRIPTION - PAIN TYPE
TYPE: ACUTE PAIN;SURGICAL PAIN

## 2021-11-10 ASSESSMENT — PAIN DESCRIPTION - ORIENTATION
ORIENTATION: LEFT
ORIENTATION: LEFT

## 2021-11-10 ASSESSMENT — PAIN DESCRIPTION - FREQUENCY
FREQUENCY: CONTINUOUS

## 2021-11-10 ASSESSMENT — PAIN SCALES - GENERAL
PAINLEVEL_OUTOF10: 6
PAINLEVEL_OUTOF10: 6
PAINLEVEL_OUTOF10: 4
PAINLEVEL_OUTOF10: 5
PAINLEVEL_OUTOF10: 0
PAINLEVEL_OUTOF10: 4

## 2021-11-10 ASSESSMENT — PAIN DESCRIPTION - LOCATION
LOCATION: FOOT;TOE (COMMENT WHICH ONE)

## 2021-11-10 ASSESSMENT — PAIN - FUNCTIONAL ASSESSMENT: PAIN_FUNCTIONAL_ASSESSMENT: 0-10

## 2021-11-10 ASSESSMENT — PAIN DESCRIPTION - DESCRIPTORS
DESCRIPTORS: BURNING
DESCRIPTORS: BURNING
DESCRIPTORS: BURNING;SORE

## 2021-11-10 NOTE — OP NOTE
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 OPEN REPAIR OF METATARSOPHALANGEAL JOINT DISLOCATION WITH FIXATION, 2ND, LEFT  2. LESSER 2ND METATARSAL OSTEOTOMY  3. METATARSOPHALANGEAL JOINT CAPSULOTOMY, 2ND, LEFT  4. HAMMER TOE CORRECTION, TOES 2-4, LEFT  5. APPLICATION BELOW KNEE SPLINT, LEFT  Surgeon(s):  Nilton Sellers DPM  Assistant:   * No surgical staff found *  Anesthesia: Choice  Hemostasis: Ankle tourniquet at 250 mmHg for 110 minutes  Estimated Blood Loss (mL): Minimal  Materials: 3-0 vicryl, 4-0 vicryl, 4-0 monocryl   Injectables: 20 cc of 2% lidocaine plain pre-op, 20 cc of 0.5% marcaine plain post-op   Complications: None  Implants:  Implant Name Type Inv.  Item Serial No.  Lot No. LRB No. Used Action   SCREW BNE DIA3MM PLNTR GRAV  SCREW BNE DIA3MM PLNTR [facility] [employer]- [id number] Left 1 Implanted   ALLOGRAFT HUM TISS 1 CC AMNIO TISS MEMBRN AMNIFLO CRYOPRES  ALLOGRAFT HUM TISS 1 CC AMNIO TISS MEMBRN AMNIFLO CRYOPRES  [employer]- [de-identified] / [id number] Left 1 Implanted   K WIRE FIX L6IN DIA0.045IN [[id number]] [[employer]]  K WIRE FIX L6IN DIA0.045IN [[id number]] [[employer]]  [employer]-  Left 2 Implanted   SCREW BNE L12MM DIA2MM FORE/MIDFOOT TI TWST OFF ST SELF DRL  SCREW BNE L12MM DIA2MM FORE/MIDFOOT TI TWST OFF ST SELF DRL  [employer] [facility]-  Left 1 Implanted   KWIRE FIX L102MM [id number] FOR HAMRTOE FIX PHALINX  KWIRE FIX L102MM [id number] FOR HAMRTOE FIX PHALINX  [facility] INC-  Left 1 Implanted   IMPLANT TOE M 0DEG FABRICIO FOR HAMRTOE FIX PHALINX  IMPLANT TOE M 0DEG FABRICIO FOR HAMRTOE  [facility] INC-WD  Left 1 Implanted        Findings: as dictated    Detailed Description of Procedure:     INDICATIONS FOR PROCEDURE: This patient has signs and symptoms clinically  consistent with the above mentioned preoperative diagnosis. Having failed conservative treatment, it was determined that the patient would benefit from surgical intervention. All potential risks, benefits, and complications were discussed with the patient prior to the scheduling of surgery. All the patient's questions were answered and no guarantees were given. The patient wished to proceed with surgery, and informed written consent was obtained. DETAILS OF PROCEDURE: The patient was brought from the pre-operative area and placed on the operating table in the supine position. A pneumatic ankle tourniquet was placed around the patient's well-padded left lower extremity. Following IV sedation, a local anesthetic block was then injected proximal to the incision site consisting of 20cc of 2% lidocaine plain. The left  lower extremity was then scrubbed, prepped, and draped in the usual sterile fashion. A time-out was performed. The patient, procedure, and operative site were confirmed. An Esmarch bandage was then utilized to exsanguinate the patient's left lower extremity. The tourniquet was then inflated to 250 mmHg and the following procedure was performed. PROCEDURES 1-3, LEFT: Attention was directed to the dorsal aspect of the left foot where a curvilinear incision line was drawn with a sterile marking pen over the dorsal aspect of the 2nd metatarsal and centering over the metarsophalangeal joint, and extending past the proximal interphalangeal joint. The incision was deepened through the subcutaneous layer with care being taken to identify and retract all vital neurovascular structures.  All venous tributaries were electrocoagulated as encountered. The extensor tendon was identified, and a transverse tenotomy and capsulotomy was created and the medial and lateral collateral ligaments were freed over the head of the proximal phalanx of the 2nd digit. Sharp and blunt dissection continued to the level of the metatarsophalangeal joint. Using a #15 blade, a transverse capsulotomy was made from dorsal to plantar and medial to lateral at the metatarsophalangeal joint, thereby releasing the capsule. Special care was taken to preserve the attachments of the medial and lateral collateral ligaments to prevent post-operative transverse plane migration of the digit at the level of the metatarsal phalangeal joint. A weightbearing attitude of the joint was then simulated utilizing the Kelikian push-up test, performed by placing a dorsiflexory force on the plantar aspect of the metatarsal head. The previously noted dorsal contracture was further reduced. Attention was then directed to the notably plantarflexed metatarsal, and attention was directed to the osteotomy. Using a #15 blade, a capsular and periosteal incision was then made linearly over the 2nd metatarsal head. The capsule and periosteal structures were meticulously reflected both dorsally, medially and laterally until excellent soft tissue exposure had been achieved. Next, a small McGlamary elevator was placed underneath the 2nd metatarsal head to free the plantar plate from the undersurface of the metatarsal head and soft tissue attachments. The articular cartilage of the metatarsal head was then inspected and noted to be white and shiny consistent with healthy cartilage. No signs of cartilage errosion or osteoarthritis was noted. Next, a through and through osteotomy was created beginning in the dorsal 1/3 of the articular cartilage and angled from dorsal-distal to plantar-proximal parallel to the weightbearing surface.  Upon completion of the the pre-operative dorsal contracture at the level of the metatarsophalangeal joint was noted. Attention was then directed towards completion of the Weil osteotomy. Utilizing modified AO fixation principle with lag technique and the manufactures recommended technique, a Stryler twist-off screw was inserted from dorsal to plantar across the osteotomy site in a perpendicular orientation. The temporary fixation was removed, and excellent bone to bone apposition was noted at the osteotomy. The remaining articular cartilage overhanging transversely following the previously performed Weil osteotomy was resected utilizing a bone rongeur. The wound was then copiously lavaged with sterile normal saline. PROCEDURE 4: At this time, attention was directed towards the dorsal aspect of the patient's 2nd digit at the PIPJ. Attention was directed towards the dorsal, medial, and lateral aspects of the patient's middle phalangeal base of the the 2nd digit and the collateral ligaments and extensor tendon was freed from the base and reflected. At this time, sagittal saw was utilized to transect the the head of the proximal phalanx just proximal to the epicondyles from dorsal to plantar, medial to lateral, and through and through. The transected bone was extubated in total from the surgical site after being freed from all soft tissue attachments. Attention was then directed towards the base of the middle phalanx of the 2nd digit where the cartilage was removed via a backbrushing technique until subchondral plate was seen. Upon completion, the two bones were coapted. Utilizing the [facility] TATTNALL drill bit, the holes were made for the middle phalanx and the proximal phalanx. Next the form fitting key was inserted into each drill hole to broach and prepare the holes for implantation. The Phalinx implant was then implanted in the head of the proximal and the base of the middle phalanx.  Intraoperative C-arm fluoroscopy was utilized to show good bone coaptation and without any osseous bridging or gapping at the arthrodesis site. This was confirmed with direct visualization, palpation and intraoperative C-arm fluoroscopy. Upon completion, weightbearing was stimulated and it was noted that there was adequate correction of the above-mentioned deformity. The surgical sites was irrigated with copious amounts of sterile saline. The extensor tendon was then reapproximated and coapted utilizing 3-0 Vicryl. The subcutaneous tissue was closed with 4-0 vicryl, and the incision was closed using 4-0 monocryl suture in a running intradermal suture technique. Attention was directed to the dorsal aspect of the 3rd digit of the left foot. Using a #15 blade, an approximately 3.5 cm curvi-linear incision was made over the dorsal aspect of the proximal interphalangeal joint and metatarsophalangeal joint. Using sharp and blunt dissection techniques, the incision was deepened through the subcutaneous tissue. Care was taken to identify and retract all vital neurovascular structures. All venous tributaries were electrocoagulated as encountered. Dissection was then continued to the level of the joint capsule. At this time, a transverse tenotomy and capsulotomy were performed at the level of the proximal interphalangeal joint. All ligamentous and capsular structures, as well as the long extensor tendon, were reflected from the underlying bone, thereby allowing exposure and visualization of the head of the proximal phalanx. The long extensor tendon was reflected from its osseous attachments back to the level of the MPJ. The contracture was noted to still be present. Attention was directed to the osteotomy. Utilizing a #160 saw blade on an oscillating saw, the head of the proximal phalanx was resected to approximately the level of the metaphyseal/diaphyseal flair and passed from the operative site.  A dorsiflexory force was then applied to the plantar aspect of the metatarsal head to simulate weight bearing and the contracture was noted to be reduced. A dorsiflexory force was then applied to the plantar aspect of the metatarsal head to simulate weight-bearing, and the digit was noted to be in proper anatomic alignment, with respect to the adjacent digits. At this time, a 0.045 inch K-wire was driven from proximal to distal through the middle and distal phalanges and allowed to exit the skin at the distal aspect of the digit. The wire was then driven in a retrograde fashion through the proximal phalanx, and across the metatarsophalangeal joint and into the distal aspect of the metatarsal head]. Proper placement of the wire into the metatarsal was confirmed by a loss of plantarflexory and dorsiflexory range of motion at the metatarsophalangeal joint. The free end of the K-wire at the distal aspect of the digit was then bent dorsally at a 90 degree ankle and cut at a length of approximately 1cm. A protective plastic Blu ball was placed over the sharp end. Attention was directed to the dorsal aspect of the 4th digit of the left foot. Using a #15 blade, an approximately 3.5 cm curvi-linear incision was made over the dorsal aspect of the proximal interphalangeal joint and metatarsophalangeal joint. Using sharp and blunt dissection techniques, the incision was deepened through the subcutaneous tissue. Care was taken to identify and retract all vital neurovascular structures. All venous tributaries were electrocoagulated as encountered. Dissection was then continued to the level of the joint capsule. At this time, a transverse tenotomy and capsulotomy were performed at the level of the proximal interphalangeal joint. All ligamentous and capsular structures, as well as the long extensor tendon, were reflected from the underlying bone, thereby allowing exposure and visualization of the head of the proximal phalanx.   The long extensor tendon was reflected from its osseous attachments back to the level of the MPJ. The contracture was noted to still be present. Attention was directed to the osteotomy. Utilizing a #160 saw blade on an oscillating saw, the head of the proximal phalanx was resected to approximately the level of the metaphyseal/diaphyseal flair and passed from the operative site. A dorsiflexory force was then applied to the plantar aspect of the metatarsal head to simulate weight bearing and the contracture was noted to be reduced. A dorsiflexory force was then applied to the plantar aspect of the metatarsal head to simulate weight-bearing, and the digit was noted to be in proper anatomic alignment, with respect to the adjacent digits. At this time, a 0.045 inch K-wire was driven from proximal to distal through the middle and distal phalanges and allowed to exit the skin at the distal aspect of the digit. The wire was then driven in a retrograde fashion through the proximal phalanx, and across the metatarsophalangeal joint and into the distal aspect of the metatarsal head. Proper placement of the wire into the metatarsal was confirmed by a loss of plantarflexory and dorsiflexory range of motion at the metatarsophalangeal joint. The free end of the K-wire at the distal aspect of the digit was then bent dorsally at a 90 degree ankle and cut at a length of approximately 1cm. A protective plastic Blu ball was placed over the sharp end. The wound was then vigorously lavaged with sterile normal saline. The extensor tendons were then reapproximated using 4-0 Vicryl suture in a simple interrupted fashion. The subcutaneous tissue was closed with 4-0 vicryl. Skin edges were then re-approximated using 4-0 monocryl suture in a running intradermal fashion. At this time, 1 cc of Amnio Milton were injected about the surgical site, and 2nd MPJ, to facilitate and enhance tissue healing and repair.  Next 20 cc of 0.5% marcaine plain were injected about the surgical site. PROCEDURE #5: At this time, a soft dry sterile dressing was applied. The pneumatic ankle tourniquet was rapidly deflated and a prompt instantaneous hyperemic response was noted on all aspects of the patients left lower extremity. Next, copious amounts of cast padding was applied to the patient's left lower extremity from the metatarsal heads to approximately 3 finger breaths distal to the head and neck of the fibula. Next, using moistened padded splint material, a posterior splint was applied from the plantar foot posteriorly up the leg to approximately the midcalf area. ACE compression was then applied from distal to proximal to secure the posterior splint in place and provide compression to prevent post-operative edema. At this time, the foot was then dorsiflexed to 90 degrees to prevent acquired equinus deformity and relieve tension on the surgical repair. END OF PROCEDURE: The patient tolerated the procedure and anesthesia well and was transported from the operating room to the PACU with vital signs stable and vascular status intact to all aspects of the patient's left lower extremity and digital capillary refill time immediate to the digits of the left  foot. Following a period of post-operative monitoring, the patient will be discharged home with written and oral wound care and follow-up instructions. The patient is to follow-up in my office in 1 week. The patient is to keep dressing clean, dry and intact at all times. The patient is to call with if any complications occur.       Electronically signed by Arnie Hastings DPM on 11/10/2021 at 4:39 PM

## 2021-11-10 NOTE — ANESTHESIA POSTPROCEDURE EVALUATION
Department of Anesthesiology  Postprocedure Note    Patient: Yari Alexander  MRN: [id number]  YOB: 1965  Date of evaluation: 11/10/2021  Time:  4:37 PM     Procedure Summary     Date: 11/10/21 Room / Location: [facility] OR  / [facility]    Anesthesia Start: 1118 Anesthesia Stop: 1347    Procedures:       ON THE LEFT: OPEN REPAIR OF METATARSOPHALANGEAL JOINT DISLOCATION WITH FIXATION, LESSER METATARSAL OSTEOTOMY, METATARSOPHALANGEAL JOINT CAPSULOTOMY, HAMMER TOE CORRECTION, APPLICATION BELOW KNEE SPLINT (Left Foot)      . (Left Foot) Diagnosis:       Metatarsalgia of left foot      Contracture of joint of left foot      Hammertoe of left foot      Dislocation of metatarsophalangeal joint of left lesser toe(s), initial encounter      Gagnon neuroma, left      (Metatarsalgia of left foot [M77.42] Contracture of joint of left foot [M24.575Hammertoe of left foot [M20.42] ] Dislocation of metatarsophalangeal joint of left lesser toe(s), initial encounter [S93.125A]Gagnon neuroma, left [G57.62])    Surgeons: Pradip Pollard DPM Responsible Provider: Hawa Tejeda DO    Anesthesia Type: general ASA Status: 2          Anesthesia Type: general    Paty Phase I: Paty Score: 10    Paty Phase II:      Last vitals: Reviewed and per EMR flowsheets.        Anesthesia Post Evaluation    Patient location during evaluation: PACU  Patient participation: complete - patient participated  Level of consciousness: awake  Pain score: 2  Airway patency: patent  Nausea & Vomiting: no nausea and no vomiting  Complications: no  Cardiovascular status: blood pressure returned to baseline  Respiratory status: acceptable  Hydration status: euvolemic

## 2021-11-10 NOTE — PROGRESS NOTES
Pt informed that his surtgery will be 30 minutes late due to the surgeon in the room before him. No complaints. Pt talking with wife in room.

## 2021-11-10 NOTE — PROGRESS NOTES
Podiatry resident at bedside. Toe regained color dangling at bedside. Pt advised not to place ice on foot or elevate too high. Patient verbalized understanding.

## 2021-11-10 NOTE — PROGRESS NOTES
Patient admitted to PACU # 17 from OR at 1348 post ON THE LEFT: OPEN REPAIR OF METATARSOPHALANGEAL JOINT DISLOCATION WITH FIXATION, LESSER METATARSAL OSTEOTOMY, METATARSOPHALANGEAL JOINT CAPSULOTOMY, HAMMER TOE CORRECTION, APPLICATION BELOW KNEE SPLINT - Left per Dr Radha Miranda. Attached to PACU monitoring system and report received from anesthesia provider. Patient was reported to be hemodynamically stable during procedure. Patient drowsy on admission and in 6/10 pain to left 4th & 5th toes. Pt noted to have purple skin tone to 2nd toe and toe feels cool to the touch. Cap refill is <3 seconds.

## 2021-11-10 NOTE — H&P
Kyle Reeves    [id number]    [facility] Same Day Surgery Update H & P  Department of General Surgery   Surgical Service   Pre-operative History and Physical  Last H & P within the last 30 days. DIAGNOSIS:   Metatarsalgia of left foot [M77.42]  Contracture of joint of left foot [M24.575]  Hammertoe of left foot [M20.42]  Dislocation of metatarsophalangeal joint of left lesser toe(s), initial encounter [S93.125A]  Gagnon neuroma, left [G57.62]    Procedure(s):  ON THE LEFT: OPEN REPAIR OF METATARSOPHALANGEAL JOINT DISLOCATION WITH FIXATION, LESSER METATARSAL OSTEOTOMY, METATARSOPHALANGEAL JOINT CAPSULOTOMY, HAMMER TOE CORRECTION, EXCISION OF GAGNON'S NEUROMA, APPLICATION BELOW KNEE SPLINT  . History obtained from: Patient interview and EHR     HISTORY OF PRESENT ILLNESS:   Patient with left foot pain and deformity. The symptoms have been recalcitrant to conservative treatment and the patient presents today for the above procedure. Covid 19:  Patient denies fever, chills, worsening cough, or known exposure to Covid-19.       Past Medical History:        Diagnosis Date    Depression     Factor V Leiden ([facility] [address]     Hx of blood clots     LEG    Hypertension     resolved after gastric sleeve done     Past Surgical History:        Procedure Laterality Date    COLONOSCOPY  11/12    normal, [id number]  dr Raghav Walton      right    KNEE ARTHROSCOPY      x 3 right knee    SHOULDER SURGERY      right     STOMACH SURGERY  08/2016    Gastric Sleeve - lost about 100 lbs so far    TESTICLE SURGERY      tumor removed right testicle- benign     WRIST SURGERY      right ulnar surgery      Past Social History:  Social History     Socioeconomic History    Marital status:      Spouse name: None    Number of children: None    Years of education: None    Highest education level: None   Occupational History    None   Tobacco Use    Smoking status: Former Smoker     Packs/day: 0.25 Years: 0.50     Pack years: 0.12    Smokeless tobacco: Never Used   Substance and Sexual Activity    Alcohol use: Yes     Alcohol/week: 0.0 standard drinks     Comment: social    Drug use: No    Sexual activity: Yes     Partners: Female   Other Topics Concern    None   Social History Narrative    ** Merged History Encounter **          Social Determinants of Health     Financial Resource Strain:     Difficulty of Paying Living Expenses: Not on file   Food Insecurity:     Worried About Running Out of Food in the Last Year: Not on file    Carline of Food in the Last Year: Not on file   Transportation Needs:     Lack of Transportation (Medical): Not on file    Lack of Transportation (Non-Medical): Not on file   Physical Activity:     Days of Exercise per Week: Not on file    Minutes of Exercise per Session: Not on file   Stress:     Feeling of Stress : Not on file   Social Connections:     Frequency of Communication with Friends and Family: Not on file    Frequency of Social Gatherings with Friends and Family: Not on file    Attends Jainism Services: Not on file    Active Member of [address] [place] [address] or Organizations: Not on file    Attends Club or Organization Meetings: Not on file    Marital Status: Not on file   Intimate Partner Violence:     Fear of Current or Ex-Partner: Not on file    Emotionally Abused: Not on file    Physically Abused: Not on file    Sexually Abused: Not on file   Housing Stability:     Unable to Pay for Housing in the Last Year: Not on file    Number of [place] in the Last Year: Not on file    Unstable Housing in the Last Year: Not on file         Medications Prior to Admission:      Prior to Admission medications    Medication Sig Start Date End Date Taking?  Authorizing Provider   escitalopram (LEXAPRO) 20 MG tablet TAKE 2 TABLETS DAILY 5/26/21  Yes Cassidy Wood DO   rivaroxaban (XARELTO) 20 MG TABS tablet 20 mg daily (with breakfast)  6/19/19  Yes Historical Provider, MD   Nutritional Supplements (VITAMIN D MAINTENANCE PO) Take 3,000 Units by mouth daily   Yes Historical Provider, MD         Allergies:  Patient has no known allergies. PHYSICAL EXAM:      BP (!) 149/106   Pulse 76   Temp 97.6 °F (36.4 °C) (Tympanic)   Resp 16   Ht 5' 11\" (1.803 m)   Wt 243 lb (110.2 kg)   SpO2 97%   BMI 33.89 kg/m²      Airway:  Airway patent with no audible stridor    Heart:  Regular rate and rhythm, No murmur noted    Lungs:  No increased work of breathing, good air exchange, clear to auscultation bilaterally, no crackles or wheezing    Abdomen:  Soft, non-distended, non-tender, no masses palpated    ASSESSMENT AND PLAN    Patient is a 64 y.o. male with above specified procedure planned. 1.  The patients history and physical was obtained and signed off by the pre-admission testing department. Patient seen and focused exam done today- no new changes since last physical exam on 10/13/21    2. Access to ancillary services are available per request of the provider.     Alejos Ormond, APRN - CNP     11/10/2021

## 2021-11-10 NOTE — PROGRESS NOTES
PACU Discharge Note    Current Allergies: Patient has no known allergies. Pt meets criteria for discharge to home per Kath Score and ASPAN standards. Discussed with patient and responsible individual receiving instructions how to measure pain per numerical scale and when to contact doctor if prescribed medications are not helping with post operative pain    Discharge instructions reviewed with patient and family. Both verbalized understanding of instructions. Gave patient and family opportunity to ask questions. All questions reviewed and answered. Documents signed and copy of discharge instructions given. Vitals:    11/10/21 1530   BP: (!) 152/91   Pulse: 89   Resp: 15   Temp: 97 °F (36.1 °C)   SpO2: 94%      BP within 20% of pt's admitting BP per KATH SCORE      Intake/Output Summary (Last 24 hours) at 11/10/2021 1557  Last data filed at 11/10/2021 1530  Gross per 24 hour   Intake 1120 ml   Output 175 ml   Net 945 ml     Left foot & ankle covered with rolled gauze, splint & ace wrap. Pins to 2nd, 3rd & 4th toes are visible. 2nd toe is less dusky and more pink now. Cap refill is <3 seconds. Pain assessment:  present - adequately treated  Pain Level: 4    Offered patient opportunity to use restroom prior to discharge. Patient used urinal while on stretcher without difficulty. Patient discharged to home/self care via wheel chair by transporter/RN with a responsible individual (wife Alecia Chávez).       11/10/2021 3:57 PM

## 2021-11-10 NOTE — BRIEF OP NOTE
Brief Postoperative Note      Patient: Vince Cranker  YOB: 1965  MRN: [id number]    Date of Procedure: 11/10/2021    Pre-Op Diagnosis: Metatarsalgia of left foot [M77.42] Contracture of joint of left foot [M24.575Hammertoe of left foot [M20.42] ] Dislocation of metatarsophalangeal joint of left lesser toe(s), initial encounter [S93.125A]Gagnon neuroma, left [G57.62]    Post-Op Diagnosis: Same       Procedure(s):  ON THE LEFT: OPEN REPAIR OF METATARSOPHALANGEAL JOINT DISLOCATION WITH FIXATION, LESSER METATARSAL OSTEOTOMY, METATARSOPHALANGEAL JOINT CAPSULOTOMY, HAMMER TOE CORRECTION, APPLICATION BELOW KNEE SPLINT  . Surgeon(s):  John Zee DPM    Assistant:  * No surgical staff found *    Anesthesia: Choice    Estimated Blood Loss (mL): Minimal    Complications: None    Specimens:   * No specimens in log *    Implants:  Implant Name Type Inv.  Item Serial No.  Lot No. LRB No. Used Action   SCREW BNE DIA3MM PLNTR GRAV  SCREW BNE DIA3MM PLNTR [facility] [employer] [facility] [id number] Left 1 Implanted   ALLOGRAFT HUM TISS 1 CC AMNIO TISS MEMBRN AMNIFLO CRYOPRES  ALLOGRAFT HUM TISS 1 CC AMNIO TISS MEMBRN AMNIFLO CRYOPRES  BONE BANK ALLOGRAFTS- [de-identified] / [id number] Left 1 Implanted   K WIRE FIX L6IN DIA0.045IN [[id number]] [[employer]]  K WIRE FIX L6IN DIA0.045IN [[id number]] [[employer]]  [employer] [facility]  Left 2 Implanted   SCREW BNE L12MM DIA2MM FORE/MIDFOOT TI TWST OFF ST SELF DRL  SCREW BNE L12MM DIA2MM FORE/MIDFOOT TI TWST OFF ST SELF DRL  [employer] [facility]  Left 1 Implanted   KWIRE FIX L102MM [id number] FOR HAMRTOE FIX PHALINX  KWIRE FIX L102MM [id number] FOR HAMRTOE FIX PHALINX  [employer] [facility]  Left 1 Implanted   IMPLANT TOE M 0DEG FABRICIO FOR HAMRTOE FIX PHALINX  IMPLANT TOE M 0DEG FABRICIO FOR HAMRTOE FIX PHALINX  [employer]  Left 1 Implanted         Drains: * No LDAs found *    Findings: As expected    Electronically signed by Juan Carlos Meza DPM on 11/10/2021 at 4:38 PM

## 2021-11-10 NOTE — ANESTHESIA PRE PROCEDURE
Department of Anesthesiology  Preprocedure Note       Name:  Enoch Castillo   Age:  64 y.o.  :  1965                                          MRN:  [id number]         Date:  11/10/2021      Surgeon: Mary Signs):  Kika Santiago DPM    Procedure: Procedure(s):  ON THE LEFT: OPEN REPAIR OF METATARSOPHALANGEAL JOINT DISLOCATION WITH FIXATION, LESSER METATARSAL OSTEOTOMY, METATARSOPHALANGEAL JOINT CAPSULOTOMY, HAMMER TOE CORRECTION, EXCISION OF JONES'S NEUROMA, APPLICATION BELOW KNEE SPLINT  . Medications prior to admission:   Prior to Admission medications    Medication Sig Start Date End Date Taking? Authorizing Provider   escitalopram (LEXAPRO) 20 MG tablet TAKE 2 TABLETS DAILY 21  Yes Elijah Blancas DO   rivaroxaban (XARELTO) 20 MG TABS tablet 20 mg daily (with breakfast)  19  Yes Historical Provider, MD   Nutritional Supplements (VITAMIN D MAINTENANCE PO) Take 3,000 Units by mouth daily   Yes Historical Provider, MD       Current medications:    No current facility-administered medications for this encounter.        Allergies:  No Known Allergies    Problem List:    Patient Active Problem List   Diagnosis Code    Pulmonary embolism (HCC) I26.99    Factor V Leiden mutation ([facility] [address] D68.51    Acute venous embolism and thrombosis of other specified veins I82.890    Long term current use of anticoagulant therapy Z79.01    Morbid obesity ([facility] E66.01    Essential hypertension I10    Moderate major depressive disorder with anxiety single episode ([facility] [address] [facility] F32.1, F41.8       Past Medical History:        Diagnosis Date    Depression     Factor V Leiden ([facility] [address] [facility]     Hx of blood clots     LEG    Hypertension     resolved after gastric sleeve done       Past Surgical History:        Procedure Laterality Date    COLONOSCOPY      normal, [id number]  dr Donal Beltrán      right    KNEE ARTHROSCOPY      x 3 right knee    SHOULDER SURGERY      right     STOMACH SURGERY  2016 Gastric Sleeve - lost about 100 lbs so far    TESTICLE SURGERY      tumor removed right testicle- benign     WRIST SURGERY      right ulnar surgery        Social History:    Social History     Tobacco Use    Smoking status: Former Smoker     Packs/day: 0.25     Years: 0.50     Pack years: 0.12    Smokeless tobacco: Never Used   Substance Use Topics    Alcohol use: Yes     Alcohol/week: 0.0 standard drinks     Comment: social                                Counseling given: Not Answered      Vital Signs (Current):   Vitals:    11/09/21 1121 11/10/21 0916   BP:  (!) 149/106   Pulse:  76   Resp:  16   Temp:  97.6 °F (36.4 °C)   TempSrc:  Tympanic   SpO2:  97%   Weight: 243 lb (110.2 kg) 243 lb (110.2 kg)   Height: 5' 11\" (1.803 m) 5' 11\" (1.803 m)                                              BP Readings from Last 3 Encounters:   11/10/21 (!) 149/106   09/05/19 110/70   06/25/19 126/80       NPO Status:                                                                                 BMI:   Wt Readings from Last 3 Encounters:   11/10/21 243 lb (110.2 kg)   09/05/19 251 lb 6.4 oz (114 kg)   06/25/19 248 lb 3.2 oz (112.6 kg)     Body mass index is 33.89 kg/m².     CBC:   Lab Results   Component Value Date    WBC 5.6 09/05/2019    RBC 4.74 09/05/2019    HGB 15.0 09/05/2019    HCT 44.0 09/05/2019    MCV 93.0 09/05/2019    RDW 13.8 09/05/2019     09/05/2019       CMP:   Lab Results   Component Value Date     09/05/2019    K 4.3 09/05/2019     09/05/2019    CO2 26 09/05/2019    BUN 16 09/05/2019    CREATININE 0.8 09/05/2019    GFRAA >60 09/05/2019    GFRAA >60 03/01/2013    AGRATIO 1.6 01/26/2015    LABGLOM >60 09/05/2019    GLUCOSE 88 09/05/2019    PROT 7.4 01/26/2015    PROT 7.5 03/01/2013    CALCIUM 9.7 09/05/2019    BILITOT 0.3 01/26/2015    ALKPHOS 78 01/26/2015    AST 27 01/26/2015    ALT 54 01/26/2015       POC Tests: No results for input(s): POCGLU, POCNA, POCK, POCCL, POCBUN, POCHEMO, POCHCT in the last 72 hours. Coags:   Lab Results   Component Value Date    PROTIME 13.7 09/05/2019    INR 1.20 09/05/2019       HCG (If Applicable): No results found for: PREGTESTUR, PREGSERUM, HCG, HCGQUANT     ABGs: No results found for: PHART, PO2ART, UVE0KVT, BEW3VRA, BEART, [id number]     Type & Screen (If Applicable):  No results found for: LABABO, LABRH    Drug/Infectious Status (If Applicable):  No results found for: HIV, HEPCAB    COVID-19 Screening (If Applicable): No results found for: COVID19        Anesthesia Evaluation  Patient summary reviewed and Nursing notes reviewed no history of anesthetic complications:   Airway: Mallampati: II  TM distance: >3 FB   Neck ROM: full  Mouth opening: > = 3 FB Dental:      Comment: Poor dentition    Pulmonary:Negative Pulmonary ROS and normal exam                               Cardiovascular:Negative CV ROS  Exercise tolerance: good (>4 METS),                     Neuro/Psych:   Negative Neuro/Psych ROS              GI/Hepatic/Renal: Neg GI/Hepatic/Renal ROS            Endo/Other:                      ROS comment: Factor V ligand on xarelto - no recent DVT/PE Abdominal:             Vascular: negative vascular ROS. Other Findings:             Anesthesia Plan      general     ASA 2       Induction: intravenous. MIPS: Postoperative opioids intended and Prophylactic antiemetics administered. Anesthetic plan and risks discussed with patient. Plan discussed with CRNA.     Attending anesthesiologist reviewed and agrees with Emilia Tapia MD   11/10/2021

## (undated) DEVICE — HAMMERTOE DRILL: Brand: PHALINX

## (undated) DEVICE — STANDARD HYPODERMIC NEEDLE,POLYPROPYLENE HUB: Brand: MONOJECT

## (undated) DEVICE — TOWEL,STOP FLAG GOLD N-W: Brand: MEDLINE

## (undated) DEVICE — STRIP,CLOSURE,WOUND,MEDI-STRIP,1/4X3: Brand: MEDLINE

## (undated) DEVICE — SUTURE VCRL SZ 3-0 L27IN ABSRB UD L26MM CT-2 1/2 CIR J232H

## (undated) DEVICE — SUTURE MCRYL SZ 4-0 L27IN ABSRB UD L19MM PS-2 1/2 CIR PRIM Y426H

## (undated) DEVICE — PRECISION THIN (7.0 X 0.38 X 29.5MM)

## (undated) DEVICE — FOOT SWITCH DRAPE: Brand: UNBRANDED

## (undated) DEVICE — MICRO SAGITTAL BLADE (5.9 X 0.4 X 26.0MM)

## (undated) DEVICE — BLANKET WRM W29.9XL79.1IN UP BODY FORC AIR MISTRAL-AIR

## (undated) DEVICE — BANDAGE COBAN 4 IN COMPR W4INXL5YD FOAM COHESIVE QUIK STK SELF ADH SFT

## (undated) DEVICE — PRECISION THIN (5.5 X 0.38 X 11.5MM)

## (undated) DEVICE — SPLNT ORTHO GLASS 4X15

## (undated) DEVICE — C-ARM: Brand: UNBRANDED

## (undated) DEVICE — COUNTER NDL 40 COUNT HLD 70 NUM FOAM BLK SGL MAG W BLDE REMV

## (undated) DEVICE — TOWEL,OR,DSP,ST,BLUE,DLX,8/PK,10PK/CS: Brand: MEDLINE

## (undated) DEVICE — PODIATRY: Brand: MEDLINE INDUSTRIES, INC.

## (undated) DEVICE — GLOVE SURG SZ 7 L12IN FNGR THK79MIL GRN LTX FREE

## (undated) DEVICE — SYRINGE MED 10ML LUERLOCK TIP W/O SFTY DISP

## (undated) DEVICE — SUTURE VCRL SZ 4-0 L27IN ABSRB UD L26MM SH 1/2 CIR J415H

## (undated) DEVICE — SOLUTION IV 1000ML 0.9% SOD CHL

## (undated) DEVICE — BLADE SURG SAW OSC ST S STL 28.5MM LEN 28MM CUT DEPTH

## (undated) DEVICE — STERILE VELCLOSE ELASTIC BANDAGE, 4IN: Brand: VELCLOSE

## (undated) DEVICE — COVER LT HNDL BLU PLAS

## (undated) DEVICE — GLOVE SURG SZ 7 CRM LTX FREE POLYISOPRENE POLYMER BEAD ANTI

## (undated) DEVICE — ZIMMER® STERILE DISPOSABLE TOURNIQUET CUFF WITH PLC, DUAL PORT, DUAL BLADDER, 18 IN. (46 CM)